# Patient Record
Sex: FEMALE | Race: WHITE | NOT HISPANIC OR LATINO | Employment: UNEMPLOYED | URBAN - METROPOLITAN AREA
[De-identification: names, ages, dates, MRNs, and addresses within clinical notes are randomized per-mention and may not be internally consistent; named-entity substitution may affect disease eponyms.]

---

## 2017-03-07 ENCOUNTER — GENERIC CONVERSION - ENCOUNTER (OUTPATIENT)
Dept: OTHER | Facility: OTHER | Age: 57
End: 2017-03-07

## 2017-04-17 DIAGNOSIS — Z13.820 ENCOUNTER FOR SCREENING FOR OSTEOPOROSIS: ICD-10-CM

## 2017-04-18 ENCOUNTER — LAB REQUISITION (OUTPATIENT)
Dept: LAB | Facility: HOSPITAL | Age: 57
End: 2017-04-18
Payer: COMMERCIAL

## 2017-04-18 ENCOUNTER — ALLSCRIPTS OFFICE VISIT (OUTPATIENT)
Dept: OTHER | Facility: OTHER | Age: 57
End: 2017-04-18

## 2017-04-18 DIAGNOSIS — Z01.419 ENCOUNTER FOR GYNECOLOGICAL EXAMINATION WITHOUT ABNORMAL FINDING: ICD-10-CM

## 2017-04-18 PROCEDURE — G0145 SCR C/V CYTO,THINLAYER,RESCR: HCPCS | Performed by: OBSTETRICS & GYNECOLOGY

## 2017-04-21 LAB
LAB AP GYN PRIMARY INTERPRETATION: NORMAL
Lab: NORMAL

## 2017-06-06 ENCOUNTER — GENERIC CONVERSION - ENCOUNTER (OUTPATIENT)
Dept: OTHER | Facility: OTHER | Age: 57
End: 2017-06-06

## 2018-01-13 VITALS
WEIGHT: 137.13 LBS | SYSTOLIC BLOOD PRESSURE: 118 MMHG | HEIGHT: 63 IN | BODY MASS INDEX: 24.3 KG/M2 | DIASTOLIC BLOOD PRESSURE: 78 MMHG

## 2018-04-24 ENCOUNTER — ANNUAL EXAM (OUTPATIENT)
Dept: GYNECOLOGY | Facility: CLINIC | Age: 58
End: 2018-04-24
Payer: COMMERCIAL

## 2018-04-24 VITALS
SYSTOLIC BLOOD PRESSURE: 140 MMHG | HEIGHT: 63 IN | BODY MASS INDEX: 24.88 KG/M2 | DIASTOLIC BLOOD PRESSURE: 82 MMHG | WEIGHT: 140.4 LBS

## 2018-04-24 DIAGNOSIS — Z01.419 ENCOUNTER FOR GYNECOLOGICAL EXAMINATION WITHOUT ABNORMAL FINDING: ICD-10-CM

## 2018-04-24 DIAGNOSIS — N95.2 ATROPHIC VAGINITIS: ICD-10-CM

## 2018-04-24 DIAGNOSIS — Z12.31 ENCOUNTER FOR SCREENING MAMMOGRAM FOR MALIGNANT NEOPLASM OF BREAST: Primary | ICD-10-CM

## 2018-04-24 DIAGNOSIS — Z12.4 ENCOUNTER FOR PAPANICOLAOU SMEAR FOR CERVICAL CANCER SCREENING: ICD-10-CM

## 2018-04-24 PROCEDURE — G0145 SCR C/V CYTO,THINLAYER,RESCR: HCPCS | Performed by: OBSTETRICS & GYNECOLOGY

## 2018-04-24 PROCEDURE — S0612 ANNUAL GYNECOLOGICAL EXAMINA: HCPCS | Performed by: OBSTETRICS & GYNECOLOGY

## 2018-04-24 RX ORDER — CYCLOSPORINE 0.5 MG/ML
EMULSION OPHTHALMIC
COMMUNITY
Start: 2018-03-21

## 2018-04-24 NOTE — PROGRESS NOTES
Assessment/Plan:         Diagnoses and all orders for this visit:    Encounter for screening mammogram for malignant neoplasm of breast  -     Mammo screening bilateral w 3d & cad; Future    Encounter for gynecological examination without abnormal finding    Atrophic vaginitis- uses osphena 1/2 tab q 2 weeks    Other orders  -     Cholecalciferol 1000 units capsule; Take 2,000 Units by mouth  -     RESTASIS 0 05 % ophthalmic emulsion;   -     Ospemifene (OSPHENA) 60 MG TABS; Take 1 tablet by mouth daily          Subjective:      Patient ID: Marisel Cui is a 62 y o  female  Annual exam  Uses osphena 1/2 tablet every 2 weeks  Is working well  No c/o  [facility] [address] [facility] [facility]    Last DEXA 6/17- osteopenia  Colonoscopy 2014 ( 5)     HPI    The following portions of the patient's history were reviewed and updated as appropriate: allergies, current medications, past family history, past medical history, past social history, past surgical history and problem list     Review of Systems   Constitutional: Negative  Gastrointestinal: Negative  Genitourinary: Negative  Objective:      /82 (BP Location: Right arm, Patient Position: Sitting, Cuff Size: Standard)   Ht 5' 2 8" (1 595 m)   Wt 63 7 kg (140 lb 6 4 oz)   BMI 25 03 kg/m²          Physical Exam   Constitutional: She appears well-developed and well-nourished  Neck: Normal range of motion  Neck supple  No thyromegaly present  Cardiovascular: Normal rate, regular rhythm and normal heart sounds  Pulmonary/Chest: Effort normal and breath sounds normal  Right breast exhibits no inverted nipple, no mass, no nipple discharge, no skin change and no tenderness  Left breast exhibits no inverted nipple, no mass, no nipple discharge, no skin change and no tenderness  Abdominal: Soft  She exhibits no distension and no mass  There is no tenderness  Hernia confirmed negative in the right inguinal area and confirmed negative in the left inguinal area  Genitourinary: There is no rash or lesion on the right labia  There is no rash or lesion on the left labia  Cervix exhibits no motion tenderness, no discharge and no friability  Right adnexum displays no mass, no tenderness and no fullness  Left adnexum displays no mass, no tenderness and no fullness  No bleeding in the vagina  No vaginal discharge found  Lymphadenopathy:        Right: No inguinal adenopathy present  Left: No inguinal adenopathy present

## 2018-04-27 LAB
LAB AP GYN PRIMARY INTERPRETATION: NORMAL
Lab: NORMAL

## 2019-03-26 DIAGNOSIS — Z12.31 ENCOUNTER FOR SCREENING MAMMOGRAM FOR MALIGNANT NEOPLASM OF BREAST: ICD-10-CM

## 2019-05-07 ENCOUNTER — ANNUAL EXAM (OUTPATIENT)
Dept: GYNECOLOGY | Facility: CLINIC | Age: 59
End: 2019-05-07
Payer: COMMERCIAL

## 2019-05-07 VITALS
HEART RATE: 110 BPM | SYSTOLIC BLOOD PRESSURE: 126 MMHG | DIASTOLIC BLOOD PRESSURE: 84 MMHG | HEIGHT: 63 IN | WEIGHT: 141.6 LBS | BODY MASS INDEX: 25.09 KG/M2

## 2019-05-07 DIAGNOSIS — Z12.31 ENCOUNTER FOR SCREENING MAMMOGRAM FOR MALIGNANT NEOPLASM OF BREAST: Primary | ICD-10-CM

## 2019-05-07 DIAGNOSIS — Z12.4 ENCOUNTER FOR PAPANICOLAOU SMEAR FOR CERVICAL CANCER SCREENING: ICD-10-CM

## 2019-05-07 DIAGNOSIS — M85.80 OSTEOPENIA, UNSPECIFIED LOCATION: ICD-10-CM

## 2019-05-07 DIAGNOSIS — Z01.419 ENCOUNTER FOR GYNECOLOGICAL EXAMINATION WITHOUT ABNORMAL FINDING: ICD-10-CM

## 2019-05-07 DIAGNOSIS — N95.2 ATROPHIC VAGINITIS: ICD-10-CM

## 2019-05-07 PROCEDURE — G0145 SCR C/V CYTO,THINLAYER,RESCR: HCPCS | Performed by: OBSTETRICS & GYNECOLOGY

## 2019-05-07 PROCEDURE — S0612 ANNUAL GYNECOLOGICAL EXAMINA: HCPCS | Performed by: OBSTETRICS & GYNECOLOGY

## 2019-05-07 RX ORDER — LORATADINE 10 MG/1
10 TABLET ORAL DAILY
COMMUNITY

## 2019-05-09 LAB
LAB AP GYN PRIMARY INTERPRETATION: NORMAL
Lab: NORMAL

## 2019-08-06 ENCOUNTER — OFFICE VISIT (OUTPATIENT)
Dept: GYNECOLOGY | Facility: CLINIC | Age: 59
End: 2019-08-06
Payer: COMMERCIAL

## 2019-08-06 VITALS
HEART RATE: 85 BPM | BODY MASS INDEX: 25.09 KG/M2 | HEIGHT: 63 IN | DIASTOLIC BLOOD PRESSURE: 88 MMHG | WEIGHT: 141.6 LBS | SYSTOLIC BLOOD PRESSURE: 120 MMHG

## 2019-08-06 DIAGNOSIS — M81.0 AGE-RELATED OSTEOPOROSIS WITHOUT CURRENT PATHOLOGICAL FRACTURE: Primary | ICD-10-CM

## 2019-08-06 PROCEDURE — 99212 OFFICE O/P EST SF 10 MIN: CPT | Performed by: OBSTETRICS & GYNECOLOGY

## 2019-08-06 RX ORDER — IBANDRONATE SODIUM 150 MG/1
150 TABLET, FILM COATED ORAL
Qty: 3 TABLET | Refills: 3 | Status: SHIPPED | OUTPATIENT
Start: 2019-08-06 | End: 2021-06-29

## 2019-08-06 NOTE — PROGRESS NOTES
Patient presents to the office today to discuss the lab work she had done along with the DEXA scan  Clinical history postmenopausal  Hologic DEXA bone densitometry of the lumbar  spine was performed  Average bone density from L1 to L4 measured 0 807 g/ cm2  This corresponds to 77 percent of peak bone mass  Findings are 2 2 standard  deviations below the mean for peak bone mass  Compared to previous examination  6/1/2017 there is no significant change  Bone densitometry of the left hip was performed  Total bone density of the left  hip measured 0 792 g/ cm2  This corresponds to 84 percent of peak bone  mass  Findings are 1 2 standard deviations below the mean for peak bone mass  In the  left femoral neck,    bone density measures  0 575 g/cm2, corresponding to  68    percent of peak bone mass and 2 5 standard deviations below the mean for peak  bone mass  Compared to previous examination, there is no significant change  Other Result Information   Interface, Rad Results In - 06/25/2019  4:47 PM EDT  Clinical history postmenopausal  Hologic DEXA bone densitometry of the lumbar  spine was performed  Average bone density from L1 to L4 measured 0 807 g/ cm2  This corresponds to 77 percent of peak bone mass  Findings are 2 2 standard  deviations below the mean for peak bone mass  Compared to previous examination  6/1/2017 there is no significant change  Bone densitometry of the left hip was performed  Total bone density of the left  hip measured 0 792 g/ cm2  This corresponds to 84 percent of peak bone  mass  Findings are 1 2 standard deviations below the mean for peak bone mass  In the  left femoral neck,    bone density measures  0 575 g/cm2, corresponding to  68    percent of peak bone mass and 2 5 standard deviations below the mean for peak  bone mass  Compared to previous examination, there is no significant change  IMPRESSION:  Impression: Osteoporosis of the left femoral neck   Currently there is  substantial increased fracture risk  No change from previous  Impression:  Osteoporosis    Plan:  Discussed options including calcium supplementation with vitamin-D along with weight-bearing exercise  Plan is to begin Boniva on a monthly basis    Instructions were given to patient

## 2020-06-23 ENCOUNTER — ANNUAL EXAM (OUTPATIENT)
Dept: GYNECOLOGY | Facility: CLINIC | Age: 60
End: 2020-06-23
Payer: COMMERCIAL

## 2020-06-23 VITALS
WEIGHT: 142.6 LBS | DIASTOLIC BLOOD PRESSURE: 80 MMHG | HEIGHT: 63 IN | HEART RATE: 131 BPM | SYSTOLIC BLOOD PRESSURE: 124 MMHG | BODY MASS INDEX: 25.27 KG/M2

## 2020-06-23 DIAGNOSIS — Z12.31 ENCOUNTER FOR SCREENING MAMMOGRAM FOR MALIGNANT NEOPLASM OF BREAST: Primary | ICD-10-CM

## 2020-06-23 DIAGNOSIS — Z12.4 ENCOUNTER FOR PAPANICOLAOU SMEAR FOR CERVICAL CANCER SCREENING: ICD-10-CM

## 2020-06-23 DIAGNOSIS — M81.0 AGE-RELATED OSTEOPOROSIS WITHOUT CURRENT PATHOLOGICAL FRACTURE: ICD-10-CM

## 2020-06-23 DIAGNOSIS — Z13.820 ENCOUNTER FOR SCREENING FOR OSTEOPOROSIS: ICD-10-CM

## 2020-06-23 DIAGNOSIS — Z78.0 MENOPAUSE: ICD-10-CM

## 2020-06-23 PROCEDURE — S0612 ANNUAL GYNECOLOGICAL EXAMINA: HCPCS | Performed by: OBSTETRICS & GYNECOLOGY

## 2020-06-23 PROCEDURE — G0145 SCR C/V CYTO,THINLAYER,RESCR: HCPCS | Performed by: OBSTETRICS & GYNECOLOGY

## 2020-06-29 LAB
LAB AP GYN PRIMARY INTERPRETATION: NORMAL
Lab: NORMAL

## 2021-05-18 DIAGNOSIS — Z12.31 ENCOUNTER FOR SCREENING MAMMOGRAM FOR MALIGNANT NEOPLASM OF BREAST: ICD-10-CM

## 2021-06-21 ENCOUNTER — TELEPHONE (OUTPATIENT)
Dept: GYNECOLOGY | Facility: CLINIC | Age: 61
End: 2021-06-21

## 2021-06-21 NOTE — TELEPHONE ENCOUNTER
[facility] called asking for dexa script for patient  Told me to fax to [phone]    Fax would not go thru

## 2021-06-29 ENCOUNTER — ANNUAL EXAM (OUTPATIENT)
Dept: GYNECOLOGY | Facility: CLINIC | Age: 61
End: 2021-06-29
Payer: COMMERCIAL

## 2021-06-29 VITALS
DIASTOLIC BLOOD PRESSURE: 78 MMHG | HEART RATE: 115 BPM | HEIGHT: 64 IN | SYSTOLIC BLOOD PRESSURE: 118 MMHG | WEIGHT: 143 LBS | BODY MASS INDEX: 24.41 KG/M2

## 2021-06-29 DIAGNOSIS — Z01.419 ENCOUNTER FOR GYNECOLOGICAL EXAMINATION WITH PAPANICOLAOU SMEAR OF CERVIX: ICD-10-CM

## 2021-06-29 DIAGNOSIS — Z01.419 ENCOUNTER FOR GYNECOLOGICAL EXAMINATION WITHOUT ABNORMAL FINDING: Primary | ICD-10-CM

## 2021-06-29 DIAGNOSIS — M81.0 AGE-RELATED OSTEOPOROSIS WITHOUT CURRENT PATHOLOGICAL FRACTURE: ICD-10-CM

## 2021-06-29 DIAGNOSIS — N95.2 ATROPHIC VAGINITIS: ICD-10-CM

## 2021-06-29 PROCEDURE — S0612 ANNUAL GYNECOLOGICAL EXAMINA: HCPCS | Performed by: OBSTETRICS & GYNECOLOGY

## 2021-06-29 PROCEDURE — G0145 SCR C/V CYTO,THINLAYER,RESCR: HCPCS | Performed by: OBSTETRICS & GYNECOLOGY

## 2021-06-29 RX ORDER — CHOLECALCIFEROL (VITAMIN D3) 1250 MCG
CAPSULE ORAL
COMMUNITY

## 2021-06-29 RX ORDER — ADAPALENE 3 MG/G
GEL TOPICAL
COMMUNITY

## 2021-06-29 NOTE — PROGRESS NOTES
Assessment/Plan:         Diagnoses and all orders for this visit:    Encounter for gynecological examination without abnormal finding    Age-related osteoporosis without current pathological fracture; recheck DEXA  If any further progression patient will consider starting Boniva    Atrophic vaginitis:  Asymptomatic-follow        Subjective:      Patient ID: Summer Nagy is a 64 y o  female  HPI  patient presents to the office for annual examination  Her only complaint is that of occasional vaginal itching  She discontinued os vena several years ago  Prior to that she was on vaginal estrogen but due to side effects all vaginal estrogens were discontinued  She did well on Osphena  She denies any vaginal discharge or bleeding  Denies any dysuria, hematuria, urgency or urinary incontinence  No GI complaints  Colonoscopy  normal    DEXA scan 2019 osteoporosis femoral neck  Patient declined Farida Xavi  She is due to have a repeat DEXA scan later this week  The following portions of the patient's history were reviewed and updated as appropriate:   She  has a past medical history of Adenomyosis, Endometriosis, and Interstitial cystitis  She There are no problems to display for this patient  She  has a past surgical history that includes  section; Colonoscopy; LAPAROSCOPY; Laparoscopic supracervical hysterectomy; and Tubal ligation  Her family history includes Breast cancer in her mother; Diabetes in her paternal grandmother; Heart disease in her father; Prostate cancer in her father  She  reports that she has never smoked  She has never used smokeless tobacco  She reports current alcohol use  She reports that she does not use drugs    Current Outpatient Medications   Medication Sig Dispense Refill    Cholecalciferol 1000 units capsule Take 2,000 Units by mouth      ibandronate (BONIVA) 150 MG tablet Take 1 tablet (150 mg total) by mouth every 30 (thirty) days for 3 doses 3 tablet 3    loratadine (CLARITIN) 10 mg tablet Take 10 mg by mouth daily      Ospemifene (OSPHENA) 60 MG TABS Take 1 tablet by mouth daily        psyllium (METAMUCIL) 58 6 % powder Take 1 packet by mouth 3 (three) times a day      RESTASIS 0 05 % ophthalmic emulsion        No current facility-administered medications for this visit  Current Outpatient Medications on File Prior to Visit   Medication Sig    Cholecalciferol 1000 units capsule Take 2,000 Units by mouth    ibandronate (BONIVA) 150 MG tablet Take 1 tablet (150 mg total) by mouth every 30 (thirty) days for 3 doses    loratadine (CLARITIN) 10 mg tablet Take 10 mg by mouth daily    Ospemifene (OSPHENA) 60 MG TABS Take 1 tablet by mouth daily      psyllium (METAMUCIL) 58 6 % powder Take 1 packet by mouth 3 (three) times a day    RESTASIS 0 05 % ophthalmic emulsion      No current facility-administered medications on file prior to visit  She is allergic to molds & smuts and pollen extract       Review of Systems   Constitutional: Negative  HENT: Negative for sore throat and trouble swallowing  Gastrointestinal: Negative  Genitourinary: Negative  Objective:      /78 (BP Location: Left arm, Patient Position: Sitting, Cuff Size: Standard)   Pulse (!) 115   Ht 5' 3 5" (1 613 m)   Wt 64 9 kg (143 lb)   BMI 24 93 kg/m²          Physical Exam  Vitals reviewed  Constitutional:       Appearance: Normal appearance  She is normal weight  Cardiovascular:      Rate and Rhythm: Normal rate and regular rhythm  Pulses: Normal pulses  Heart sounds: Normal heart sounds  No murmur heard  Pulmonary:      Effort: Pulmonary effort is normal  No respiratory distress  Breath sounds: Normal breath sounds  Chest:      Breasts:         Right: No swelling, bleeding, inverted nipple, mass, nipple discharge, skin change or tenderness           Left: No swelling, bleeding, inverted nipple, mass, nipple discharge, skin change or tenderness  Abdominal:      General: There is no distension  Palpations: Abdomen is soft  There is no mass  Tenderness: There is no abdominal tenderness  There is no guarding or rebound  Hernia: No hernia is present  There is no hernia in the left inguinal area or right inguinal area  Genitourinary:     General: Normal vulva  Labia:         Right: No rash, tenderness or lesion  Left: No rash, tenderness or lesion  Vagina: Normal       Cervix: Normal       Uterus: Absent  Adnexa:         Right: No mass, tenderness or fullness  Left: No mass, tenderness or fullness  Musculoskeletal:      Cervical back: Normal range of motion and neck supple  No tenderness  Lymphadenopathy:      Cervical: No cervical adenopathy  Upper Body:      Right upper body: No supraclavicular, axillary or pectoral adenopathy  Left upper body: No supraclavicular, axillary or pectoral adenopathy  Lower Body: No right inguinal adenopathy  No left inguinal adenopathy  Neurological:      Mental Status: She is alert

## 2021-07-02 LAB
LAB AP GYN PRIMARY INTERPRETATION: NORMAL
Lab: NORMAL

## 2022-01-19 ENCOUNTER — DOCUMENTATION (OUTPATIENT)
Dept: GYNECOLOGY | Facility: CLINIC | Age: 62
End: 2022-01-19

## 2022-01-19 ENCOUNTER — OFFICE VISIT (OUTPATIENT)
Dept: GYNECOLOGY | Facility: CLINIC | Age: 62
End: 2022-01-19
Payer: COMMERCIAL

## 2022-01-19 VITALS
BODY MASS INDEX: 25.09 KG/M2 | HEIGHT: 63 IN | WEIGHT: 141.6 LBS | SYSTOLIC BLOOD PRESSURE: 120 MMHG | DIASTOLIC BLOOD PRESSURE: 68 MMHG

## 2022-01-19 DIAGNOSIS — R10.9 ABDOMINAL DISCOMFORT: Primary | ICD-10-CM

## 2022-01-19 DIAGNOSIS — N95.2 ATROPHIC VAGINITIS: ICD-10-CM

## 2022-01-19 DIAGNOSIS — R10.2 PELVIC PAIN: ICD-10-CM

## 2022-01-19 DIAGNOSIS — R50.9 FEVER, UNSPECIFIED FEVER CAUSE: ICD-10-CM

## 2022-01-19 DIAGNOSIS — B37.9 CANDIDIASIS: ICD-10-CM

## 2022-01-19 DIAGNOSIS — R14.0 ABDOMINAL BLOATING: ICD-10-CM

## 2022-01-19 LAB
SL AMB  POCT GLUCOSE, UA: NEGATIVE
SL AMB LEUKOCYTE ESTERASE,UA: NEGATIVE
SL AMB POCT BILIRUBIN,UA: NEGATIVE
SL AMB POCT BLOOD,UA: NEGATIVE
SL AMB POCT CLARITY,UA: CLEAR
SL AMB POCT COLOR,UA: YELLOW
SL AMB POCT KETONES,UA: NEGATIVE
SL AMB POCT NITRITE,UA: NEGATIVE
SL AMB POCT PH,UA: 5
SL AMB POCT SPECIFIC GRAVITY,UA: 1.01
SL AMB POCT URINE PROTEIN: NEGATIVE
SL AMB POCT UROBILINOGEN: 0.2

## 2022-01-19 PROCEDURE — 81002 URINALYSIS NONAUTO W/O SCOPE: CPT | Performed by: OBSTETRICS & GYNECOLOGY

## 2022-01-19 PROCEDURE — 99212 OFFICE O/P EST SF 10 MIN: CPT | Performed by: OBSTETRICS & GYNECOLOGY

## 2022-01-19 RX ORDER — CLOTRIMAZOLE AND BETAMETHASONE DIPROPIONATE 10; .64 MG/G; MG/G
CREAM TOPICAL 2 TIMES DAILY
Qty: 30 G | Refills: 0 | Status: SHIPPED | OUTPATIENT
Start: 2022-01-19

## 2022-01-19 RX ORDER — IBUPROFEN 200 MG
TABLET ORAL EVERY 6 HOURS
COMMUNITY

## 2022-01-19 RX ORDER — CYCLOSPORINE 0.5 MG/ML
EMULSION OPHTHALMIC 2 TIMES DAILY
COMMUNITY

## 2022-01-19 RX ORDER — ACETAMINOPHEN 500 MG
TABLET ORAL AS NEEDED
COMMUNITY

## 2022-01-19 NOTE — PROGRESS NOTES
Assessment/Plan:         Diagnoses and all orders for this visit:    Abdominal discomfort; will check CT scan of the abdomen and pelvis  -     POCT urine dip    Atrophic vaginitis; follow    Candidiasis  -     clotrimazole-betamethasone (LOTRISONE) 1-0 05 % cream; Apply topically 2 (two) times a day    Other orders  -     Multiple Vitamins-Minerals (Centrum Silver 50+Women) TABS; Take 1 tablet by mouth in the morning  -     cycloSPORINE (Restasis) 0 05 % ophthalmic emulsion; Apply to eye 2 (two) times a day  -     acetaminophen (TYLENOL) 500 mg tablet; as needed  -     ibuprofen (Advil) 200 mg tablet; every 6 (six) hours        Subjective:      Patient ID: Sandra Rico is a 64 y o  female  HPI patient presents to the office complaining of some vaginal itching  She also is complaining lower abdominal discomfort primarily suprapubic  This is associated with bloating  Two nights ago she had a low-grade fever of 100 1  Last evening 99 1  She denies any nausea vomiting diarrhea or constipation  Denies any dysuria, hematuria urgency urinary incontinence  Denies any vaginal discharge  No recent antibiotics  Patient is status post 18 [facility]  She does have known vaginal atrophy but due to side effects from various medications she is not on any medications at present time for atrophy  Colonoscopy 2014 normal   Due again in     The following portions of the patient's history were reviewed and updated as appropriate:   She  has a past medical history of Adenomyosis, Endometriosis, and Interstitial cystitis  She There are no problems to display for this patient  She  has a past surgical history that includes  section; Colonoscopy; LAPAROSCOPY; Laparoscopic supracervical hysterectomy; and Tubal ligation  Her family history includes Breast cancer in her mother; Diabetes in her paternal grandmother; Heart disease in her father; Prostate cancer in her father    She  reports that she has never smoked  She has never used smokeless tobacco  She reports current alcohol use  She reports that she does not use drugs  Current Outpatient Medications   Medication Sig Dispense Refill    acetaminophen (TYLENOL) 500 mg tablet as needed      Cholecalciferol 1000 units capsule Take 2,000 Units by mouth      cycloSPORINE (Restasis) 0 05 % ophthalmic emulsion Apply to eye 2 (two) times a day      ibuprofen (Advil) 200 mg tablet every 6 (six) hours      loratadine (CLARITIN) 10 mg tablet Take 10 mg by mouth daily      Multiple Vitamins-Minerals (Centrum Silver 50+Women) TABS Take 1 tablet by mouth in the morning      RESTASIS 0 05 % ophthalmic emulsion       Adapalene (Differin) 0 3 % gel Differin 0 3 % topical gel (Patient not taking: Reported on 1/19/2022)      Cholecalciferol (Vitamin D3) 1 25 MG (42821 UT) CAPS Vitamin D3 (Patient not taking: Reported on 1/19/2022)      clotrimazole-betamethasone (LOTRISONE) 1-0 05 % cream Apply topically 2 (two) times a day 30 g 0    psyllium (METAMUCIL) 58 6 % powder Take 1 packet by mouth 3 (three) times a day (Patient not taking: Reported on 1/19/2022 )       No current facility-administered medications for this visit       Current Outpatient Medications on File Prior to Visit   Medication Sig    acetaminophen (TYLENOL) 500 mg tablet as needed    Cholecalciferol 1000 units capsule Take 2,000 Units by mouth    cycloSPORINE (Restasis) 0 05 % ophthalmic emulsion Apply to eye 2 (two) times a day    ibuprofen (Advil) 200 mg tablet every 6 (six) hours    loratadine (CLARITIN) 10 mg tablet Take 10 mg by mouth daily    Multiple Vitamins-Minerals (Centrum Silver 50+Women) TABS Take 1 tablet by mouth in the morning    RESTASIS 0 05 % ophthalmic emulsion     Adapalene (Differin) 0 3 % gel Differin 0 3 % topical gel (Patient not taking: Reported on 1/19/2022)    Cholecalciferol (Vitamin D3) 1 25 MG (50485 UT) CAPS Vitamin D3 (Patient not taking: Reported on 1/19/2022)    psyllium (METAMUCIL) 58 6 % powder Take 1 packet by mouth 3 (three) times a day (Patient not taking: Reported on 1/19/2022 )     No current facility-administered medications on file prior to visit  She is allergic to molds & smuts and pollen extract       Review of Systems   Constitutional: Negative  Gastrointestinal: Positive for abdominal distention  Negative for anal bleeding, blood in stool, constipation, diarrhea, nausea and vomiting  Genitourinary: Negative for difficulty urinating, dysuria, enuresis, frequency, genital sores, pelvic pain, vaginal bleeding and vaginal discharge  Objective:      /68 (BP Location: Left arm, Patient Position: Sitting, Cuff Size: Standard)   Ht 5' 3" (1 6 m)   Wt 64 2 kg (141 lb 9 6 oz)   Breastfeeding No   BMI 25 08 kg/m²          Physical Exam  Vitals reviewed  Constitutional:       Appearance: She is normal weight  Abdominal:      General: There is no distension  Palpations: Abdomen is soft  There is no mass  Tenderness: There is no abdominal tenderness  There is no guarding or rebound  Hernia: No hernia is present  There is no hernia in the left inguinal area or right inguinal area  Genitourinary:     General: Normal vulva  Labia:         Right: Rash present  Left: Rash present  Urethra: No prolapse, urethral pain, urethral swelling or urethral lesion  Vagina: Erythema (Atrophic) present  No vaginal discharge  Cervix: Normal       Uterus: Absent  Adnexa:         Right: No mass, tenderness or fullness  Left: No mass, tenderness or fullness  Comments: Erythematous rash extending from bilateral labial, perineal body, and perirectal  Lymphadenopathy:      Lower Body: No right inguinal adenopathy  No left inguinal adenopathy  Neurological:      Mental Status: She is alert         UA: Negative

## 2022-07-12 ENCOUNTER — ANNUAL EXAM (OUTPATIENT)
Dept: GYNECOLOGY | Facility: CLINIC | Age: 62
End: 2022-07-12
Payer: COMMERCIAL

## 2022-07-12 VITALS
SYSTOLIC BLOOD PRESSURE: 118 MMHG | DIASTOLIC BLOOD PRESSURE: 78 MMHG | WEIGHT: 142.8 LBS | BODY MASS INDEX: 25.3 KG/M2 | HEIGHT: 63 IN | HEART RATE: 96 BPM

## 2022-07-12 DIAGNOSIS — N95.2 ATROPHIC VAGINITIS: ICD-10-CM

## 2022-07-12 DIAGNOSIS — Z01.419 ENCOUNTER FOR GYNECOLOGICAL EXAMINATION WITHOUT ABNORMAL FINDING: Primary | ICD-10-CM

## 2022-07-12 PROCEDURE — S0612 ANNUAL GYNECOLOGICAL EXAMINA: HCPCS | Performed by: OBSTETRICS & GYNECOLOGY

## 2022-07-12 PROCEDURE — G0145 SCR C/V CYTO,THINLAYER,RESCR: HCPCS | Performed by: OBSTETRICS & GYNECOLOGY

## 2022-07-12 RX ORDER — ESTRADIOL 10 UG/1
1 INSERT VAGINAL 2 TIMES WEEKLY
Qty: 8.57 TABLET | Refills: 2 | Status: SHIPPED | OUTPATIENT
Start: 2022-07-14 | End: 2022-08-01

## 2022-07-12 NOTE — PROGRESS NOTES
Assessment/Plan:         Diagnoses and all orders for this visit:    Encounter for gynecological examination without abnormal finding    Atrophic vaginitis  -     estradiol (VAGIFEM, YUVAFEM) 10 MCG TABS vaginal tablet; Insert 1 tablet (10 mcg total) into the vagina 2 (two) times a week for 24 doses        Subjective:      Patient ID: Devika Chen is a 58 y o  female  HPI patient presents for annual examination  She is status post 18 [facility]  She presents complaining of vaginal irritation dryness  She has been on vaginal estrogen products in the past but due to significant side effects she discontinued these  She would like to retry vaginal estrogen  She denies any vaginal discharge or bleeding  Denies any dysuria, hematuria urgency urinary incontinence  No GI complaints  Colonoscopy : Normal: Repeat in 10 years     DEXA scan 2021: Osteopenia in the lumbar spine hip and femoral neck  FRAX score 1 3% and 10%  The following portions of the patient's history were reviewed and updated as appropriate:   She  has a past medical history of Adenomyosis, Endometriosis, and Interstitial cystitis  She There are no problems to display for this patient  She  has a past surgical history that includes  section; Colonoscopy; LAPAROSCOPY; Laparoscopic supracervical hysterectomy; and Tubal ligation  Her family history includes Breast cancer in her mother; Diabetes in her paternal grandmother; Heart disease in her father; Prostate cancer in her father  She  reports that she has never smoked  She has never used smokeless tobacco  She reports current alcohol use  She reports that she does not use drugs    Current Outpatient Medications   Medication Sig Dispense Refill    acetaminophen (TYLENOL) 500 mg tablet as needed      Cholecalciferol 1000 units capsule Take 2,000 Units by mouth      cycloSPORINE (Restasis) 0 05 % ophthalmic emulsion Apply to eye 2 (two) times a day      [START ON 7/14/2022] estradiol (VAGIFEM, YUVAFEM) 10 MCG TABS vaginal tablet Insert 1 tablet (10 mcg total) into the vagina 2 (two) times a week for 24 doses 8 57 tablet 2    ibuprofen (MOTRIN) 200 mg tablet every 6 (six) hours      loratadine (CLARITIN) 10 mg tablet Take 10 mg by mouth daily      Multiple Vitamins-Minerals (Centrum Silver 50+Women) TABS Take 1 tablet by mouth in the morning      RESTASIS 0 05 % ophthalmic emulsion       Adapalene (Differin) 0 3 % gel Differin 0 3 % topical gel (Patient not taking: Reported on 1/19/2022)      Cholecalciferol (Vitamin D3) 1 25 MG (13012 UT) CAPS Vitamin D3 (Patient not taking: Reported on 1/19/2022)      clotrimazole-betamethasone (LOTRISONE) 1-0 05 % cream Apply topically 2 (two) times a day (Patient not taking: Reported on 7/12/2022) 30 g 0    psyllium (METAMUCIL) 58 6 % powder Take 1 packet by mouth 3 (three) times a day (Patient not taking: Reported on 1/19/2022 )       No current facility-administered medications for this visit       Current Outpatient Medications on File Prior to Visit   Medication Sig    acetaminophen (TYLENOL) 500 mg tablet as needed    Cholecalciferol 1000 units capsule Take 2,000 Units by mouth    cycloSPORINE (Restasis) 0 05 % ophthalmic emulsion Apply to eye 2 (two) times a day    ibuprofen (MOTRIN) 200 mg tablet every 6 (six) hours    loratadine (CLARITIN) 10 mg tablet Take 10 mg by mouth daily    Multiple Vitamins-Minerals (Centrum Silver 50+Women) TABS Take 1 tablet by mouth in the morning    RESTASIS 0 05 % ophthalmic emulsion     Adapalene (Differin) 0 3 % gel Differin 0 3 % topical gel (Patient not taking: Reported on 1/19/2022)    Cholecalciferol (Vitamin D3) 1 25 MG (36750 UT) CAPS Vitamin D3 (Patient not taking: Reported on 1/19/2022)    clotrimazole-betamethasone (LOTRISONE) 1-0 05 % cream Apply topically 2 (two) times a day (Patient not taking: Reported on 7/12/2022)    psyllium (METAMUCIL) 58 6 % powder Take 1 packet by mouth 3 (three) times a day (Patient not taking: Reported on 1/19/2022 )     No current facility-administered medications on file prior to visit  She is allergic to molds & smuts and pollen extract       Review of Systems   Constitutional: Negative  HENT: Negative for sore throat and trouble swallowing  Gastrointestinal: Negative  Genitourinary: Negative  Objective:      /78   Pulse 96   Ht 5' 3" (1 6 m)   Wt 64 8 kg (142 lb 12 8 oz)   BMI 25 30 kg/m²          Physical Exam  Vitals reviewed  Constitutional:       Appearance: Normal appearance  She is normal weight  Cardiovascular:      Rate and Rhythm: Normal rate and regular rhythm  Pulses: Normal pulses  Heart sounds: Normal heart sounds  No murmur heard  Pulmonary:      Effort: Pulmonary effort is normal  No respiratory distress  Breath sounds: Normal breath sounds  Chest:   Breasts:      Right: No swelling, bleeding, inverted nipple, mass, nipple discharge, skin change, tenderness, axillary adenopathy or supraclavicular adenopathy  Left: No swelling, bleeding, inverted nipple, mass, nipple discharge, skin change, tenderness, axillary adenopathy or supraclavicular adenopathy  Abdominal:      General: There is no distension  Palpations: Abdomen is soft  There is no mass  Tenderness: There is no abdominal tenderness  There is no guarding or rebound  Hernia: No hernia is present  There is no hernia in the left inguinal area or right inguinal area  Genitourinary:     General: Normal vulva  Labia:         Right: No rash, tenderness or lesion  Left: No rash, tenderness or lesion  Vagina: Erythema (atrophic) present  Cervix: Normal       Uterus: Absent  Adnexa:         Right: No mass, tenderness or fullness  Left: No mass, tenderness or fullness  Musculoskeletal:      Cervical back: Normal range of motion and neck supple  No tenderness  Lymphadenopathy:      Cervical: No cervical adenopathy  Upper Body:      Right upper body: No supraclavicular, axillary or pectoral adenopathy  Left upper body: No supraclavicular, axillary or pectoral adenopathy  Lower Body: No right inguinal adenopathy  No left inguinal adenopathy  Neurological:      Mental Status: She is alert

## 2022-07-20 LAB
LAB AP GYN PRIMARY INTERPRETATION: NORMAL
Lab: NORMAL

## 2022-08-01 DIAGNOSIS — N95.2 ATROPHIC VAGINITIS: ICD-10-CM

## 2022-08-01 RX ORDER — ESTRADIOL 10 UG/1
TABLET VAGINAL
Qty: 24 TABLET | Refills: 1 | Status: SHIPPED | OUTPATIENT
Start: 2022-08-01

## 2023-07-25 ENCOUNTER — ANNUAL EXAM (OUTPATIENT)
Dept: GYNECOLOGY | Facility: CLINIC | Age: 63
End: 2023-07-25
Payer: COMMERCIAL

## 2023-07-25 VITALS
BODY MASS INDEX: 25.8 KG/M2 | WEIGHT: 145.6 LBS | HEART RATE: 77 BPM | HEIGHT: 63 IN | SYSTOLIC BLOOD PRESSURE: 144 MMHG | DIASTOLIC BLOOD PRESSURE: 80 MMHG

## 2023-07-25 DIAGNOSIS — N95.2 ATROPHIC VAGINITIS: ICD-10-CM

## 2023-07-25 DIAGNOSIS — Z01.419 ENCOUNTER FOR GYNECOLOGICAL EXAMINATION WITHOUT ABNORMAL FINDING: Primary | ICD-10-CM

## 2023-07-25 DIAGNOSIS — M85.80 OSTEOPENIA, UNSPECIFIED LOCATION: ICD-10-CM

## 2023-07-25 DIAGNOSIS — Z78.0 MENOPAUSE: ICD-10-CM

## 2023-07-25 DIAGNOSIS — Z12.31 ENCOUNTER FOR SCREENING MAMMOGRAM FOR MALIGNANT NEOPLASM OF BREAST: ICD-10-CM

## 2023-07-25 DIAGNOSIS — Z13.820 SCREENING FOR OSTEOPOROSIS: ICD-10-CM

## 2023-07-25 PROCEDURE — G0145 SCR C/V CYTO,THINLAYER,RESCR: HCPCS | Performed by: OBSTETRICS & GYNECOLOGY

## 2023-07-25 PROCEDURE — S0612 ANNUAL GYNECOLOGICAL EXAMINA: HCPCS | Performed by: OBSTETRICS & GYNECOLOGY

## 2023-07-25 NOTE — PROGRESS NOTES
Assessment/Plan:         Diagnoses and all orders for this visit:    Encounter for gynecological examination without abnormal finding    Atrophic vaginitis; continue Vagifem as directed    Osteopenia, unspecified location    Screening for osteoporosis; DEXA scan ordered    Menopause        Subjective:      Patient ID: Ghazala Garcia is a 61 y.o. female. HPI patient presents for annual examination. She offers no complaints. She has been using Vagifem tablets just 1 tablet every 4 to 5 weeks. She has had a reaction to typical usage of vaginal estrogen. 1 tablet every 4 to 6 weeks seems to be sufficient. She denies any vaginal irritation burning discharge or bleeding. She denies any dysuria, hematuria urgency or urinary incontinence. No GI complaints. Colonoscopy 2023. Polyp identified. Repeat in 5 years    DEXA scan 2021. Osteopenia    The following portions of the patient's history were reviewed and updated as appropriate:   She  has a past medical history of Adenomyosis, Endometriosis, and Interstitial cystitis. She There are no problems to display for this patient. She  has a past surgical history that includes  section; Colonoscopy; LAPAROSCOPY; Laparoscopic supracervical hysterectomy; and Tubal ligation. Her family history includes Breast cancer in her mother; Diabetes in her paternal grandmother; Heart disease in her father; Prostate cancer in her father. She  reports that she has never smoked. She has never used smokeless tobacco. She reports current alcohol use. She reports that she does not use drugs.   Current Outpatient Medications   Medication Sig Dispense Refill   • acetaminophen (TYLENOL) 500 mg tablet as needed     • Cholecalciferol 1000 units capsule Take 2,000 Units by mouth     • cycloSPORINE (Restasis) 0.05 % ophthalmic emulsion Apply to eye 2 (two) times a day     • ibuprofen (MOTRIN) 200 mg tablet every 6 (six) hours     • loratadine (CLARITIN) 10 mg tablet Take 10 mg by mouth daily     • Multiple Vitamins-Minerals (Centrum Silver 50+Women) TABS Take 1 tablet by mouth in the morning     • RESTASIS 0.05 % ophthalmic emulsion      • Yuvafem 10 MCG TABS vaginal tablet INSERT 1 TABLET (10 MCG TOTAL) INTO THE VAGINA 2 (TWO) TIMES A WEEK FOR 24 DOSES 24 tablet 1   • Adapalene (Differin) 0.3 % gel Differin 0.3 % topical gel (Patient not taking: No sig reported)     • Cholecalciferol (Vitamin D3) 1.25 MG (80403 UT) CAPS Vitamin D3 (Patient not taking: No sig reported)     • clotrimazole-betamethasone (LOTRISONE) 1-0.05 % cream Apply topically 2 (two) times a day (Patient not taking: Reported on 7/12/2022) 30 g 0   • psyllium (METAMUCIL) 58.6 % powder Take 1 packet by mouth 3 (three) times a day       No current facility-administered medications for this visit.      Current Outpatient Medications on File Prior to Visit   Medication Sig   • acetaminophen (TYLENOL) 500 mg tablet as needed   • Cholecalciferol 1000 units capsule Take 2,000 Units by mouth   • cycloSPORINE (Restasis) 0.05 % ophthalmic emulsion Apply to eye 2 (two) times a day   • ibuprofen (MOTRIN) 200 mg tablet every 6 (six) hours   • loratadine (CLARITIN) 10 mg tablet Take 10 mg by mouth daily   • Multiple Vitamins-Minerals (Centrum Silver 50+Women) TABS Take 1 tablet by mouth in the morning   • RESTASIS 0.05 % ophthalmic emulsion    • Yuvafem 10 MCG TABS vaginal tablet INSERT 1 TABLET (10 MCG TOTAL) INTO THE VAGINA 2 (TWO) TIMES A WEEK FOR 24 DOSES   • Adapalene (Differin) 0.3 % gel Differin 0.3 % topical gel (Patient not taking: No sig reported)   • Cholecalciferol (Vitamin D3) 1.25 MG (15999 UT) CAPS Vitamin D3 (Patient not taking: No sig reported)   • clotrimazole-betamethasone (LOTRISONE) 1-0.05 % cream Apply topically 2 (two) times a day (Patient not taking: Reported on 7/12/2022)   • psyllium (METAMUCIL) 58.6 % powder Take 1 packet by mouth 3 (three) times a day     No current facility-administered medications on file prior to visit. She is allergic to molds & smuts and pollen extract. .    Review of Systems   Constitutional: Negative. HENT: Negative for sore throat and trouble swallowing. Gastrointestinal: Negative. Genitourinary: Negative. Objective:      /80   Pulse 77   Ht 5' 3" (1.6 m)   Wt 66 kg (145 lb 9.6 oz)   BMI 25.79 kg/m²          Physical Exam  Vitals reviewed. Constitutional:       Appearance: Normal appearance. She is normal weight. Cardiovascular:      Rate and Rhythm: Normal rate and regular rhythm. Pulses: Normal pulses. Heart sounds: Normal heart sounds. No murmur heard. Pulmonary:      Effort: Pulmonary effort is normal. No respiratory distress. Breath sounds: Normal breath sounds. Chest:   Breasts:     Right: No swelling, bleeding, inverted nipple, mass, nipple discharge, skin change or tenderness. Left: No swelling, bleeding, inverted nipple, mass, nipple discharge, skin change or tenderness. Abdominal:      General: There is no distension. Palpations: Abdomen is soft. There is no mass. Tenderness: There is no abdominal tenderness. There is no guarding or rebound. Hernia: No hernia is present. There is no hernia in the left inguinal area or right inguinal area. Genitourinary:     General: Normal vulva. Labia:         Right: No rash, tenderness or lesion. Left: No rash, tenderness or lesion. Vagina: Normal.      Cervix: Normal.      Uterus: Absent. Adnexa:         Right: No mass, tenderness or fullness. Left: No mass, tenderness or fullness. Musculoskeletal:      Cervical back: Normal range of motion and neck supple. No tenderness. Lymphadenopathy:      Cervical: No cervical adenopathy. Upper Body:      Right upper body: No supraclavicular, axillary or pectoral adenopathy. Left upper body: No supraclavicular, axillary or pectoral adenopathy.       Lower Body: No right inguinal adenopathy. No left inguinal adenopathy. Neurological:      Mental Status: She is alert.

## 2023-08-01 LAB
LAB AP GYN PRIMARY INTERPRETATION: NORMAL
Lab: NORMAL

## 2023-12-08 ENCOUNTER — OFFICE VISIT (OUTPATIENT)
Dept: GYNECOLOGY | Facility: CLINIC | Age: 63
End: 2023-12-08
Payer: COMMERCIAL

## 2023-12-08 VITALS
DIASTOLIC BLOOD PRESSURE: 100 MMHG | WEIGHT: 146.6 LBS | BODY MASS INDEX: 25.97 KG/M2 | HEART RATE: 137 BPM | SYSTOLIC BLOOD PRESSURE: 160 MMHG

## 2023-12-08 DIAGNOSIS — B37.9 CANDIDIASIS: Primary | ICD-10-CM

## 2023-12-08 DIAGNOSIS — N95.2 ATROPHIC VAGINITIS: ICD-10-CM

## 2023-12-08 PROCEDURE — 99213 OFFICE O/P EST LOW 20 MIN: CPT | Performed by: OBSTETRICS & GYNECOLOGY

## 2023-12-08 RX ORDER — CLOTRIMAZOLE AND BETAMETHASONE DIPROPIONATE 10; .64 MG/G; MG/G
CREAM TOPICAL 2 TIMES DAILY
Qty: 30 G | Refills: 0 | Status: SHIPPED | OUTPATIENT
Start: 2023-12-08

## 2023-12-08 RX ORDER — ESTRADIOL 10 UG/1
INSERT VAGINAL
Qty: 24 TABLET | Refills: 1 | Status: SHIPPED | OUTPATIENT
Start: 2023-12-08

## 2023-12-08 NOTE — PROGRESS NOTES
Assessment/Plan:         Diagnoses and all orders for this visit:    Candidiasis  -     terconazole (TERAZOL 7) 0.4 % vaginal cream; Insert 1 applicator into the vagina daily at bedtime  -     clotrimazole-betamethasone (LOTRISONE) 1-0.05 % cream; Apply topically 2 (two) times a day      Subjective:      Patient ID: Padmini Rice is a 61 y.o. female. HPI patient presents the office complaining of some vaginal itching and burning over the past couple weeks. There is a slight discharge. Since yesterday she has seen an improvement. She denies any fever, dysuria, urgency or urinary incontinence. No recent antibiotics or steroids. She does use Vagifem tablets occasionally. The following portions of the patient's history were reviewed and updated as appropriate: She  has a past medical history of Adenomyosis, Endometriosis, and Interstitial cystitis. She There are no problems to display for this patient. She  has a past surgical history that includes  section; Colonoscopy; LAPAROSCOPY; Laparoscopic supracervical hysterectomy; and Tubal ligation. Her family history includes Breast cancer in her mother; Diabetes in her paternal grandmother; Heart disease in her father; Prostate cancer in her father. She  reports that she has never smoked. She has never used smokeless tobacco. She reports current alcohol use. She reports that she does not use drugs.   Current Outpatient Medications   Medication Sig Dispense Refill    acetaminophen (TYLENOL) 500 mg tablet as needed      Cholecalciferol 1000 units capsule Take 2,000 Units by mouth      clotrimazole-betamethasone (LOTRISONE) 1-0.05 % cream Apply topically 2 (two) times a day 30 g 0    cycloSPORINE (Restasis) 0.05 % ophthalmic emulsion Apply to eye 2 (two) times a day      ibuprofen (MOTRIN) 200 mg tablet every 6 (six) hours      loratadine (CLARITIN) 10 mg tablet Take 10 mg by mouth daily      Multiple Vitamins-Minerals (Centrum Silver 50+Women) TABS Take 1 tablet by mouth in the morning      RESTASIS 0.05 % ophthalmic emulsion       terconazole (TERAZOL 7) 0.4 % vaginal cream Insert 1 applicator into the vagina daily at bedtime 45 g 0    Yuvafem 10 MCG TABS vaginal tablet INSERT 1 TABLET (10 MCG TOTAL) INTO THE VAGINA 2 (TWO) TIMES A WEEK FOR 24 DOSES 24 tablet 1    Adapalene (Differin) 0.3 % gel Differin 0.3 % topical gel (Patient not taking: No sig reported)      Cholecalciferol (Vitamin D3) 1.25 MG (87716 UT) CAPS Vitamin D3 (Patient not taking: No sig reported)      psyllium (METAMUCIL) 58.6 % powder Take 1 packet by mouth 3 (three) times a day       No current facility-administered medications for this visit. Current Outpatient Medications on File Prior to Visit   Medication Sig    acetaminophen (TYLENOL) 500 mg tablet as needed    Cholecalciferol 1000 units capsule Take 2,000 Units by mouth    cycloSPORINE (Restasis) 0.05 % ophthalmic emulsion Apply to eye 2 (two) times a day    ibuprofen (MOTRIN) 200 mg tablet every 6 (six) hours    loratadine (CLARITIN) 10 mg tablet Take 10 mg by mouth daily    Multiple Vitamins-Minerals (Centrum Silver 50+Women) TABS Take 1 tablet by mouth in the morning    RESTASIS 0.05 % ophthalmic emulsion     Yuvafem 10 MCG TABS vaginal tablet INSERT 1 TABLET (10 MCG TOTAL) INTO THE VAGINA 2 (TWO) TIMES A WEEK FOR 24 DOSES    Adapalene (Differin) 0.3 % gel Differin 0.3 % topical gel (Patient not taking: No sig reported)    Cholecalciferol (Vitamin D3) 1.25 MG (12558 UT) CAPS Vitamin D3 (Patient not taking: No sig reported)    psyllium (METAMUCIL) 58.6 % powder Take 1 packet by mouth 3 (three) times a day    [DISCONTINUED] clotrimazole-betamethasone (LOTRISONE) 1-0.05 % cream Apply topically 2 (two) times a day (Patient not taking: Reported on 7/12/2022)     No current facility-administered medications on file prior to visit. She is allergic to molds & smuts and pollen extract. .    Review of Systems   Constitutional: Negative. Gastrointestinal: Negative. Genitourinary:  Positive for vaginal discharge. Objective:      /100   Pulse (!) 137   Wt 66.5 kg (146 lb 9.6 oz)   BMI 25.97 kg/m²          Physical Exam  Vitals reviewed. Abdominal:      General: There is no distension. Palpations: Abdomen is soft. There is no mass. Tenderness: There is no abdominal tenderness. There is no guarding or rebound. Hernia: No hernia is present. There is no hernia in the left inguinal area or right inguinal area. Genitourinary:     General: Normal vulva. Labia:         Right: Rash present. No tenderness or lesion. Left: Rash present. No tenderness or lesion. Vagina: Vaginal discharge present. Cervix: Normal.      Uterus: Absent. Adnexa:         Right: No mass, tenderness or fullness. Left: No mass, tenderness or fullness. Lymphadenopathy:      Lower Body: No right inguinal adenopathy. No left inguinal adenopathy. Neurological:      Mental Status: She is alert.

## 2024-01-12 ENCOUNTER — TELEPHONE (OUTPATIENT)
Dept: GYNECOLOGY | Facility: CLINIC | Age: 64
End: 2024-01-12

## 2024-01-12 NOTE — TELEPHONE ENCOUNTER
Dr. BONNER patient, stating a  yeast infection has returned. Patient was just here on 12/8/23 for one and she finished T7 and Lotrisone. She is inquiring if she could have something again or your thoughts. Please advise.

## 2024-01-15 ENCOUNTER — VBI (OUTPATIENT)
Dept: ADMINISTRATIVE | Facility: OTHER | Age: 64
End: 2024-01-15

## 2024-01-15 NOTE — TELEPHONE ENCOUNTER
Upon review of the In Basket request we were able to locate, review, and update the patient chart as requested for DEXA Scan.    Any additional questions or concerns should be emailed to the Practice Liaisons via the appropriate education email address, please do not reply via In Basket.    Thank you  MARCOS BEVERLY

## 2024-01-17 NOTE — PROGRESS NOTES
"Assessment/Plan:    Will treat with Lotrisone cream to affected area twice daily for 2 weeks, daily for 2 weeks and then once weekly. Will check with PCP on routine blood work to ensure recent FBS done.   Sure swab sent.     Diagnoses and all orders for this visit:    Acute vulvitis    Candidiasis  -     clotrimazole-betamethasone (LOTRISONE) 1-0.05 % cream; Apply topically 2 (two) times a day Twice a day for 2 weeks, daily for 2 weeks and then once weekly  -     Sureswab(R), Bacterial Vaginosis/Vaginitis        Subjective:      Patient ID: Kaitlin Diaz is a 63 y.o. female.    Pt presents for persistent yeast infection sx. She was treated on 12/8/23 with terazole 7 and lotrisone cream. She states sx resolved and returned about 2 weeks ago. Symptoms include vulvovaginal irritation. Denies discharge, odor.   She uses only water for personal hygiene.   Uses Vagifem, but infrequently secondary to irritation with use.  Last FBS on record 106, 2022. Pt believes she's had other blood work, none available for review.        The following portions of the patient's history were reviewed and updated as appropriate: allergies, current medications, past family history, past medical history, past social history, past surgical history and problem list.    Review of Systems   Constitutional: Negative.    Respiratory: Negative.     Cardiovascular: Negative.    Gastrointestinal: Negative.    Endocrine: Negative.    Genitourinary:  Negative for dysuria, frequency, pelvic pain, urgency, vaginal bleeding and vaginal discharge.   Musculoskeletal: Negative.    Skin: Negative.    Neurological: Negative.    Psychiatric/Behavioral: Negative.           Objective:      /70   Pulse 105   Ht 5' 3\" (1.6 m)   Wt 67.1 kg (148 lb)   BMI 26.22 kg/m²          Physical Exam  Vitals and nursing note reviewed. Exam conducted with a chaperone present.   Constitutional:       Appearance: Normal appearance.   HENT:      Head: Normocephalic and " atraumatic.   Pulmonary:      Effort: Pulmonary effort is normal.   Abdominal:      Hernia: There is no hernia in the left inguinal area or right inguinal area.   Genitourinary:     Exam position: Supine.      Labia:         Right: No lesion or injury.         Left: No lesion or injury.       Urethra: No urethral pain, urethral swelling or urethral lesion.      Vagina: No signs of injury and foreign body. Vaginal discharge (thin mucoid d/c) present. No erythema, tenderness, bleeding, lesions or prolapsed vaginal walls.          Comments: Erythema in marked area, no lesions  Musculoskeletal:         General: Normal range of motion.      Cervical back: Normal range of motion.   Lymphadenopathy:      Lower Body: No right inguinal adenopathy. No left inguinal adenopathy.   Skin:     General: Skin is warm and dry.   Neurological:      Mental Status: She is alert and oriented to person, place, and time.   Psychiatric:         Mood and Affect: Mood normal.         Behavior: Behavior normal.         Thought Content: Thought content normal.         Judgment: Judgment normal.

## 2024-01-22 ENCOUNTER — OFFICE VISIT (OUTPATIENT)
Dept: OBGYN CLINIC | Facility: CLINIC | Age: 64
End: 2024-01-22
Payer: COMMERCIAL

## 2024-01-22 VITALS
HEIGHT: 63 IN | WEIGHT: 148 LBS | BODY MASS INDEX: 26.22 KG/M2 | HEART RATE: 105 BPM | DIASTOLIC BLOOD PRESSURE: 70 MMHG | SYSTOLIC BLOOD PRESSURE: 130 MMHG

## 2024-01-22 DIAGNOSIS — B37.9 CANDIDIASIS: ICD-10-CM

## 2024-01-22 DIAGNOSIS — N76.2 ACUTE VULVITIS: Primary | ICD-10-CM

## 2024-01-22 PROCEDURE — 99213 OFFICE O/P EST LOW 20 MIN: CPT | Performed by: OBSTETRICS & GYNECOLOGY

## 2024-01-22 RX ORDER — CLOTRIMAZOLE AND BETAMETHASONE DIPROPIONATE 10; .64 MG/G; MG/G
CREAM TOPICAL 2 TIMES DAILY
Qty: 45 G | Refills: 1 | Status: SHIPPED | OUTPATIENT
Start: 2024-01-22

## 2024-01-23 LAB
BV BACTERIA RRNA VAG QL NAA+PROBE: NEGATIVE
C GLABRATA RNA VAG QL NAA+PROBE: NOT DETECTED
CANDIDA RRNA VAG QL PROBE: NOT DETECTED
T VAGINALIS RRNA SPEC QL NAA+PROBE: NOT DETECTED

## 2024-02-14 ENCOUNTER — OFFICE VISIT (OUTPATIENT)
Dept: GYNECOLOGY | Facility: CLINIC | Age: 64
End: 2024-02-14
Payer: COMMERCIAL

## 2024-02-14 VITALS
DIASTOLIC BLOOD PRESSURE: 80 MMHG | WEIGHT: 146 LBS | HEART RATE: 92 BPM | HEIGHT: 63 IN | BODY MASS INDEX: 25.87 KG/M2 | SYSTOLIC BLOOD PRESSURE: 132 MMHG

## 2024-02-14 DIAGNOSIS — N89.8 VAGINAL IRRITATION: ICD-10-CM

## 2024-02-14 DIAGNOSIS — N95.2 ATROPHIC VAGINITIS: Primary | ICD-10-CM

## 2024-02-14 PROCEDURE — 99213 OFFICE O/P EST LOW 20 MIN: CPT | Performed by: OBSTETRICS & GYNECOLOGY

## 2024-02-14 NOTE — PROGRESS NOTES
Assessment/Plan:         Diagnoses and all orders for this visit:    Atrophic vaginitis  ; patient will increase Vagifem to 1 tablet twice weekly    Subjective:      Patient ID: Kaitlin Diaz is a 63 y.o. female.    HPI patient presents the office complaining of vaginal burning and irritation.  This has been going on for couple weeks.  She denies any vaginal discharge or bleeding.  She had been seeing in December with a yeast infection and was treated with Terazol 7 and Lotrisone which did alleviate her symptoms until recently.  She had seen Emelia on  complaining of irritation Emelia started her on Lotrisone with no improvement.  Patient has been using Vagifem for atrophy although she uses it very infrequently 1 tablet every 4 to 5 weeks.  When she had used it as directed few years ago as we bumped up the estrogen she had significant side effects    The following portions of the patient's history were reviewed and updated as appropriate: She  has a past medical history of Adenomyosis, Endometriosis, and Interstitial cystitis.  She There are no problems to display for this patient.   She  has a past surgical history that includes  section; Colonoscopy; LAPAROSCOPY; Laparoscopic supracervical hysterectomy; and Tubal ligation.  Her family history includes Breast cancer in her mother; Diabetes in her paternal grandmother; Heart disease in her father; Prostate cancer in her father.  She  reports that she has never smoked. She has never used smokeless tobacco. She reports current alcohol use. She reports that she does not use drugs.  Current Outpatient Medications   Medication Sig Dispense Refill    Cholecalciferol (Vitamin D3) 1.25 MG (12126 UT) CAPS Vitamin D3      Cholecalciferol 1000 units capsule Take 2,000 Units by mouth      clotrimazole-betamethasone (LOTRISONE) 1-0.05 % cream Apply topically 2 (two) times a day Twice a day for 2 weeks, daily for 2 weeks and then once weekly 45 g 1    cycloSPORINE  "(Restasis) 0.05 % ophthalmic emulsion Apply to eye 2 (two) times a day      estradiol (Yuvafem) 10 MCG TABS vaginal tablet 1 tablet vaginal twice weekly (Patient not taking: Reported on 1/22/2024) 24 tablet 1    loratadine (CLARITIN) 10 mg tablet Take 10 mg by mouth daily      Multiple Vitamins-Minerals (Centrum Silver 50+Women) TABS Take 1 tablet by mouth in the morning      RESTASIS 0.05 % ophthalmic emulsion        No current facility-administered medications for this visit.     Current Outpatient Medications on File Prior to Visit   Medication Sig    Cholecalciferol (Vitamin D3) 1.25 MG (48363 UT) CAPS Vitamin D3    Cholecalciferol 1000 units capsule Take 2,000 Units by mouth    clotrimazole-betamethasone (LOTRISONE) 1-0.05 % cream Apply topically 2 (two) times a day Twice a day for 2 weeks, daily for 2 weeks and then once weekly    cycloSPORINE (Restasis) 0.05 % ophthalmic emulsion Apply to eye 2 (two) times a day    estradiol (Yuvafem) 10 MCG TABS vaginal tablet 1 tablet vaginal twice weekly (Patient not taking: Reported on 1/22/2024)    loratadine (CLARITIN) 10 mg tablet Take 10 mg by mouth daily    Multiple Vitamins-Minerals (Centrum Silver 50+Women) TABS Take 1 tablet by mouth in the morning    RESTASIS 0.05 % ophthalmic emulsion     [DISCONTINUED] Adapalene (Differin) 0.3 % gel Differin 0.3 % topical gel (Patient not taking: No sig reported)    [DISCONTINUED] psyllium (METAMUCIL) 58.6 % powder Take 1 packet by mouth 3 (three) times a day     No current facility-administered medications on file prior to visit.     She is allergic to molds & smuts and pollen extract..    Review of Systems      Objective:      /80   Pulse 92   Ht 5' 3\" (1.6 m)   Wt 66.2 kg (146 lb)   BMI 25.86 kg/m²          Physical Exam  Vitals reviewed.   Abdominal:      General: There is no distension.      Palpations: Abdomen is soft. There is no mass.      Tenderness: There is no abdominal tenderness. There is no guarding or " rebound.      Hernia: No hernia is present. There is no hernia in the left inguinal area or right inguinal area.   Genitourinary:     General: Normal vulva.      Labia:         Right: No rash, tenderness or lesion.         Left: No rash, tenderness or lesion.       Vagina: Erythema (atrophic) present.      Cervix: Normal.      Uterus: Normal.       Adnexa:         Right: No mass, tenderness or fullness.          Left: No mass, tenderness or fullness.        Comments: Scant discharge present.  Sure swab culture obtained  Lymphadenopathy:      Lower Body: No right inguinal adenopathy. No left inguinal adenopathy.   Neurological:      Mental Status: She is alert.

## 2024-08-13 ENCOUNTER — ANNUAL EXAM (OUTPATIENT)
Dept: GYNECOLOGY | Facility: CLINIC | Age: 64
End: 2024-08-13
Payer: COMMERCIAL

## 2024-08-13 VITALS
BODY MASS INDEX: 26.15 KG/M2 | DIASTOLIC BLOOD PRESSURE: 80 MMHG | HEIGHT: 63 IN | HEART RATE: 91 BPM | WEIGHT: 147.6 LBS | SYSTOLIC BLOOD PRESSURE: 144 MMHG

## 2024-08-13 DIAGNOSIS — N95.2 ATROPHIC VAGINITIS: ICD-10-CM

## 2024-08-13 DIAGNOSIS — Z01.419 ENCOUNTER FOR GYNECOLOGICAL EXAMINATION WITHOUT ABNORMAL FINDING: Primary | ICD-10-CM

## 2024-08-13 DIAGNOSIS — M85.80 OSTEOPENIA, UNSPECIFIED LOCATION: ICD-10-CM

## 2024-08-13 DIAGNOSIS — Z12.31 ENCOUNTER FOR SCREENING MAMMOGRAM FOR MALIGNANT NEOPLASM OF BREAST: ICD-10-CM

## 2024-08-13 PROCEDURE — S0612 ANNUAL GYNECOLOGICAL EXAMINA: HCPCS | Performed by: OBSTETRICS & GYNECOLOGY

## 2024-08-13 PROCEDURE — G0145 SCR C/V CYTO,THINLAYER,RESCR: HCPCS | Performed by: OBSTETRICS & GYNECOLOGY

## 2024-08-13 RX ORDER — ESTRADIOL 10 UG/1
INSERT VAGINAL
Qty: 24 TABLET | Refills: 3 | Status: SHIPPED | OUTPATIENT
Start: 2024-08-13

## 2024-08-13 NOTE — PROGRESS NOTES
"Ambulatory Visit  Name: Kaitlin Diaz      : 1960      MRN: [id number]  Encounter Provider: Marvel Beltre DO  Encounter Date: 2024   Encounter department: [facility]    Assessment & Plan   1. Encounter for gynecological examination without abnormal finding  2. Encounter for screening mammogram for malignant neoplasm of breast  -     Mammo screening bilateral w 3d & cad; Future; Expected date: 2025  3. Atrophic vaginitis  -     estradiol (Yuvafem) 10 MCG TABS vaginal tablet; 1 tablet vaginal twice weekly  4. Osteopenia, unspecified location      History of Present Illness     Kaitlin Diaz is a 64 y.o. female who presents for annual examination.  She offers no complaints.  She is status post [facility] BSO.  She is presently on Vagifem tablets once weekly.  She is doing well at this dosage.  She denies any vaginal irritation, burning, discharge or bleeding.  Denies any dysuria, hematuria urgency or urinary incontinence.  No GI complaints.    Colonoscopy 2023.  Polyp identified.  Repeat in 5 years.    DEXA scan 2024.  Osteopenia    Review of Systems   Constitutional: Negative.    HENT:  Negative for sore throat and trouble swallowing.    Gastrointestinal: Negative.    Genitourinary: Negative.        Objective     /80   Pulse 91   Ht 5' 3\" (1.6 m)   Wt 67 kg (147 lb 9.6 oz)   BMI 26.15 kg/m²     Physical Exam  Vitals reviewed.   Constitutional:       Appearance: Normal appearance. She is normal weight.   Cardiovascular:      Rate and Rhythm: Normal rate and regular rhythm.      Pulses: Normal pulses.      Heart sounds: Normal heart sounds. No murmur heard.  Pulmonary:      Effort: Pulmonary effort is normal. No respiratory distress.      Breath sounds: Normal breath sounds.   Abdominal:      General: There is no distension.      Palpations: Abdomen is soft. There is no mass.      Tenderness: There is no abdominal tenderness. There is no " guarding or rebound.      Hernia: No hernia is present. There is no hernia in the left inguinal area or right inguinal area.   Genitourinary:     General: Normal vulva.      Labia:         Right: No rash, tenderness or lesion.         Left: No rash, tenderness or lesion.       Vagina: Normal.      Cervix: Normal.   Musculoskeletal:      Cervical back: Normal range of motion and neck supple. No tenderness.   Lymphadenopathy:      Cervical: No cervical adenopathy.      Lower Body: No right inguinal adenopathy. No left inguinal adenopathy.   Neurological:      Mental Status: She is alert.       Administrative Statements

## 2024-08-22 LAB
LAB AP GYN PRIMARY INTERPRETATION: NORMAL
Lab: NORMAL

## 2025-01-07 ENCOUNTER — NURSE TRIAGE (OUTPATIENT)
Age: 65
End: 2025-01-07

## 2025-01-07 NOTE — TELEPHONE ENCOUNTER
"Patient reporting burning, itching and irritation for a few weeks. Unsure if her vaginal estradiol is causing the irritation/dryness. Scheduled for eval Thursday per patient request. Discussed general genital hygiene- keep genital area clean and dry, wearing loose fitting clothing, cotton underwear, and avoiding any new or scented lotions, soaps, or detergents as well as avoiding douching and feminine hygiene products.     Reason for Disposition   Vaginal itching and not improved > 3 days following Care Advice    Answer Assessment - Initial Assessment Questions  1. SYMPTOM: \"What's the main symptom you're concerned about?\" (e.g., pain, itching, dryness)      Burning, itching and irritation  2. LOCATION: \"Where is the  sx located?\" (e.g., inside/outside, left/right)      Vaginal area  3. ONSET: \"When did the  sx  start?\"      A few weeks ago  4. PAIN: \"Is there any pain?\" If Yes, ask: \"How bad is it?\" (Scale: 1-10; mild, moderate, severe)      denies  5. ITCHING: \"Is there any itching?\" If Yes, ask: \"How bad is it?\" (Scale: 1-10; mild, moderate, severe)      yes  6. CAUSE: \"What do you think is causing the discharge?\" \"Have you had the same problem before?\" \"What happened then?\"      N/a  7. OTHER SYMPTOMS: \"Do you have any other symptoms?\" (e.g., fever, itching, vaginal bleeding, pain with urination, injury to genital area, vaginal foreign body)      denies  8. PREGNANCY: \"Is there any chance you are pregnant?\" \"When was your last menstrual period?\"      N/A    Protocols used: Vaginal Symptoms-Adult-[place]    "

## 2025-01-09 ENCOUNTER — OFFICE VISIT (OUTPATIENT)
Dept: GYNECOLOGY | Facility: CLINIC | Age: 65
End: 2025-01-09
Payer: COMMERCIAL

## 2025-01-09 VITALS
SYSTOLIC BLOOD PRESSURE: 120 MMHG | DIASTOLIC BLOOD PRESSURE: 78 MMHG | HEIGHT: 63 IN | WEIGHT: 146 LBS | BODY MASS INDEX: 25.87 KG/M2 | HEART RATE: 108 BPM

## 2025-01-09 DIAGNOSIS — B37.9 CANDIDIASIS: Primary | ICD-10-CM

## 2025-01-09 PROCEDURE — 87210 SMEAR WET MOUNT SALINE/INK: CPT | Performed by: OBSTETRICS & GYNECOLOGY

## 2025-01-09 PROCEDURE — 99213 OFFICE O/P EST LOW 20 MIN: CPT | Performed by: OBSTETRICS & GYNECOLOGY

## 2025-01-09 RX ORDER — CLOTRIMAZOLE AND BETAMETHASONE DIPROPIONATE 10; .64 MG/G; MG/G
CREAM TOPICAL 2 TIMES DAILY
Qty: 45 G | Refills: 1 | Status: SHIPPED | OUTPATIENT
Start: 2025-01-09

## 2025-01-09 RX ORDER — FLUCONAZOLE 150 MG/1
150 TABLET ORAL ONCE
Qty: 1 TABLET | Refills: 0 | Status: SHIPPED | OUTPATIENT
Start: 2025-01-09 | End: 2025-01-09

## 2025-01-09 NOTE — PROGRESS NOTES
"Name: Kaitlin Diaz      : 1960      MRN: [id number]  Encounter Provider: Marvel Beltre DO  Encounter Date: 2025   Encounter department: [facility]  :  Assessment & Plan  Candidiasis    Orders:    fluconazole (DIFLUCAN) 150 mg tablet; Take 1 tablet (150 mg total) by mouth once for 1 dose    clotrimazole-betamethasone (LOTRISONE) 1-0.05 % cream; Apply topically 2 (two) times a day        History of Present Illness   HPI  Kaitlin Diaz is a 64 y.o. female who presents complaining of vaginal itching and burning with a slight discharge.  This has been ongoing for the past couple weeks.  She was on a course of antibiotics in October for pneumonia and an ear infection.  She denies any dysuria, hematuria urgency or urinary incontinence.  No fever.  No abdominal or pelvic pain.        Review of Systems       Objective   /78   Pulse (!) 108   Ht 5' 3\" (1.6 m)   Wt 66.2 kg (146 lb)   BMI 25.86 kg/m²      Physical Exam  Vitals reviewed.   Abdominal:      Palpations: Abdomen is soft.      Hernia: There is no hernia in the left inguinal area or right inguinal area.   Genitourinary:     General: Normal vulva.      Labia:         Right: Rash present. No tenderness or lesion.         Left: Rash present. No tenderness or lesion.       Vagina: Vaginal discharge present.      Cervix: Normal.      Uterus: Absent.       Adnexa:         Right: No mass, tenderness or fullness.          Left: No mass, tenderness or fullness.        Comments: Wet mt: + hyphae  Lymphadenopathy:      Lower Body: No right inguinal adenopathy. No left inguinal adenopathy.   Neurological:      Mental Status: She is alert.           "

## 2025-01-24 ENCOUNTER — TELEPHONE (OUTPATIENT)
Dept: GYNECOLOGY | Facility: CLINIC | Age: 65
End: 2025-01-24

## 2025-01-24 DIAGNOSIS — N89.8 VAGINAL ITCHING: Primary | ICD-10-CM

## 2025-01-24 DIAGNOSIS — N89.8 VAGINAL DISCHARGE: ICD-10-CM

## 2025-02-07 ENCOUNTER — NURSE TRIAGE (OUTPATIENT)
Age: 65
End: 2025-02-07

## 2025-02-07 NOTE — TELEPHONE ENCOUNTER
"Patient calling to report recurrent vaginitis symptoms. Notes vulvovaginal itching and burning, mostly external, with intermittent slight white discharge. Was seen on 1/9 and diagnosed with yeast infection. Prescribed diflucan and lotrisone. Symptoms resolved but reoccurred 1 day ago. Inquired if patient started using the lostrisone cream again. She states she has restarted it but report feeling like it is more irritating and doesn't feel like it helps.    Patient at work and requests detailed message on voicemail if she does not answer.    Message to Dr. Beltre.    Reason for Disposition   MODERATE-SEVERE itching (i.e., interferes with school, work, or sleep)    Answer Assessment - Initial Assessment Questions  1. SYMPTOM: \"What's the main symptom you're concerned about?\" (e.g., pain, itching, dryness)      Itching burning slight white discharge  2. LOCATION: \"Where is the  s/s located?\" (e.g., inside/outside, left/right)      Vulvovaginal - mostly external  3. ONSET: \"When did the  s/s  start?\"      yesterday  4. PAIN: \"Is there any pain?\" If Yes, ask: \"How bad is it?\" (Scale: 1-10; mild, moderate, severe)      Denies  5. ITCHING: \"Is there any itching?\" If Yes, ask: \"How bad is it?\" (Scale: 1-10; mild, moderate, severe)      moderated  6. CAUSE: \"What do you think is causing the discharge?\" \"Have you had the same problem before?\" \"What happened then?\"      Possible [place]   7. OTHER SYMPTOMS: \"Do you have any other symptoms?\" (e.g., fever, itching, vaginal bleeding, pain with urination, injury to genital area, vaginal foreign body)      Denies vaginal bleeding, urinary concerns, fever  8. PREGNANCY: \"Is there any chance you are pregnant?\" \"When was your last menstrual period?\"      S/p hysterectomy    Protocols used: Vaginal Symptoms-Adult-[place]    "

## 2025-02-07 NOTE — TELEPHONE ENCOUNTER
Left detailed message per patient request regarding response, meds, and recommendation per Dr. Beltre. Offered call back for any further questions or concerns at [phone].

## 2025-02-17 ENCOUNTER — NURSE TRIAGE (OUTPATIENT)
Age: 65
End: 2025-02-17

## 2025-02-17 DIAGNOSIS — B37.31 VAGINAL YEAST INFECTION: Primary | ICD-10-CM

## 2025-02-17 RX ORDER — FLUCONAZOLE 150 MG/1
TABLET ORAL
Qty: 2 TABLET | Refills: 0 | Status: SHIPPED | OUTPATIENT
Start: 2025-02-17 | End: 2025-02-19

## 2025-02-17 NOTE — TELEPHONE ENCOUNTER
"Pt called in stating she was prescribed terazole and Lotrisone recently for ongoing vaginal burning/itching. She is using them as prescribed but symptoms are worse. Denies any discharge, bleeding or other concerns. States she is going on vacation next week and would like some relief prior.     Reason for Disposition   Pain in genital area is a chronic symptom (recurrent or ongoing AND present > 4 weeks)    Answer Assessment - Initial Assessment Questions  1. SYMPTOM: \"What's the main symptom you're concerned about?\" (e.g., pain, itching, dryness)      Vaginal burning  2. LOCATION: \"Where is the  symptom located?\" (e.g., inside/outside, left/right)      Vaginal area  3. ONSET: \"When did the  symptoms  start?\"      Ongoing for years- flared up the last few months  4. PAIN: \"Is there any pain?\" If Yes, ask: \"How bad is it?\" (Scale: 1-10; mild, moderate, severe)      7-8/10  5. ITCHING: \"Is there any itching?\" If Yes, ask: \"How bad is it?\" (Scale: 1-10; mild, moderate, severe)      mild  6. CAUSE: \"What do you think is causing the discharge?\" \"Have you had the same problem before?\" \"What happened then?\"      denies  7. OTHER SYMPTOMS: \"Do you have any other symptoms?\" (e.g., fever, itching, vaginal bleeding, pain with urination, injury to genital area, vaginal foreign body)      denies  8. PREGNANCY: \"Is there any chance you are pregnant?\" \"When was your last menstrual period?\"      hysterectomy    Protocols used: Vaginal Symptoms-Adult-[place]    "
Spoke with pt to confirm pharmacy. She is aware med is being sent in for her. Rx awaiting signature   
20-May-2017

## 2025-08-06 ENCOUNTER — EVALUATION (OUTPATIENT)
Dept: PHYSICAL THERAPY | Facility: CLINIC | Age: 65
End: 2025-08-06
Payer: MEDICARE

## 2025-08-06 DIAGNOSIS — M25.512 LEFT SHOULDER PAIN, UNSPECIFIED CHRONICITY: Primary | ICD-10-CM

## 2025-08-06 PROCEDURE — 97161 PT EVAL LOW COMPLEX 20 MIN: CPT | Performed by: PHYSICAL THERAPIST

## 2025-08-06 PROCEDURE — 97140 MANUAL THERAPY 1/> REGIONS: CPT | Performed by: PHYSICAL THERAPIST

## 2025-08-06 PROCEDURE — 97110 THERAPEUTIC EXERCISES: CPT | Performed by: PHYSICAL THERAPIST

## 2025-08-13 ENCOUNTER — OFFICE VISIT (OUTPATIENT)
Dept: PHYSICAL THERAPY | Facility: CLINIC | Age: 65
End: 2025-08-13
Attending: FAMILY MEDICINE
Payer: MEDICARE

## 2025-08-15 ENCOUNTER — OFFICE VISIT (OUTPATIENT)
Dept: PHYSICAL THERAPY | Facility: CLINIC | Age: 65
End: 2025-08-15
Attending: FAMILY MEDICINE
Payer: MEDICARE

## 2025-08-20 ENCOUNTER — OFFICE VISIT (OUTPATIENT)
Dept: PHYSICAL THERAPY | Facility: CLINIC | Age: 65
End: 2025-08-20
Attending: FAMILY MEDICINE
Payer: MEDICARE

## 2025-08-20 DIAGNOSIS — M25.512 LEFT SHOULDER PAIN, UNSPECIFIED CHRONICITY: Primary | ICD-10-CM

## 2025-08-20 PROCEDURE — 97110 THERAPEUTIC EXERCISES: CPT | Performed by: PHYSICAL THERAPIST

## 2025-08-20 PROCEDURE — 97140 MANUAL THERAPY 1/> REGIONS: CPT | Performed by: PHYSICAL THERAPIST

## 2025-08-20 PROCEDURE — 97010 HOT OR COLD PACKS THERAPY: CPT | Performed by: PHYSICAL THERAPIST

## 2025-08-22 ENCOUNTER — OFFICE VISIT (OUTPATIENT)
Dept: PHYSICAL THERAPY | Facility: CLINIC | Age: 65
End: 2025-08-22
Attending: FAMILY MEDICINE
Payer: MEDICARE

## 2025-08-22 DIAGNOSIS — M25.512 LEFT SHOULDER PAIN, UNSPECIFIED CHRONICITY: Primary | ICD-10-CM

## 2025-08-22 PROCEDURE — 97110 THERAPEUTIC EXERCISES: CPT

## 2025-08-22 PROCEDURE — 97112 NEUROMUSCULAR REEDUCATION: CPT

## 2025-08-22 PROCEDURE — 97140 MANUAL THERAPY 1/> REGIONS: CPT
